# Patient Record
Sex: FEMALE | Race: OTHER | Employment: FULL TIME | URBAN - METROPOLITAN AREA
[De-identification: names, ages, dates, MRNs, and addresses within clinical notes are randomized per-mention and may not be internally consistent; named-entity substitution may affect disease eponyms.]

---

## 2017-05-26 ENCOUNTER — OFFICE VISIT (OUTPATIENT)
Dept: OBGYN CLINIC | Facility: CLINIC | Age: 52
End: 2017-05-26

## 2017-05-26 VITALS
HEIGHT: 64 IN | WEIGHT: 135.88 LBS | DIASTOLIC BLOOD PRESSURE: 76 MMHG | SYSTOLIC BLOOD PRESSURE: 120 MMHG | BODY MASS INDEX: 23.2 KG/M2

## 2017-05-26 DIAGNOSIS — Z12.4 ROUTINE CERVICAL SMEAR: ICD-10-CM

## 2017-05-26 DIAGNOSIS — Z01.419 WOMEN'S ANNUAL ROUTINE GYNECOLOGICAL EXAMINATION: Primary | ICD-10-CM

## 2017-05-26 PROBLEM — Z98.890 HX OF SHOULDER SURGERY: Status: ACTIVE | Noted: 2017-05-26

## 2017-05-26 PROCEDURE — 99396 PREV VISIT EST AGE 40-64: CPT | Performed by: OBSTETRICS & GYNECOLOGY

## 2017-05-26 PROCEDURE — 87624 HPV HI-RISK TYP POOLED RSLT: CPT | Performed by: OBSTETRICS & GYNECOLOGY

## 2017-05-26 PROCEDURE — 88175 CYTOPATH C/V AUTO FLUID REDO: CPT | Performed by: OBSTETRICS & GYNECOLOGY

## 2017-05-26 NOTE — PROGRESS NOTES
Rebeca Palomino is here for a checkup. She has no gynecologic complaints. Patient is easing into menopause nicely she has not had a period for about 7 months. Occasionally she gets hot flashes at night. Patient is current with her colonoscopy.     Her mammogram History  None on file     Social History Main Topics   Smoking status: Never Smoker     Smokeless tobacco: Not on file    Alcohol Use: Yes  0.0 oz/week    0 Standard drinks or equivalent per week         Drug Use: No    Sexual Activity: Yes     Other Topic

## 2018-07-26 ENCOUNTER — OFFICE VISIT (OUTPATIENT)
Dept: OBGYN CLINIC | Facility: CLINIC | Age: 53
End: 2018-07-26
Payer: COMMERCIAL

## 2018-07-26 VITALS
SYSTOLIC BLOOD PRESSURE: 122 MMHG | DIASTOLIC BLOOD PRESSURE: 74 MMHG | HEIGHT: 64 IN | WEIGHT: 139 LBS | BODY MASS INDEX: 23.73 KG/M2

## 2018-07-26 DIAGNOSIS — Z01.419 WOMEN'S ANNUAL ROUTINE GYNECOLOGICAL EXAMINATION: Primary | ICD-10-CM

## 2018-07-26 PROBLEM — Z78.0 MENOPAUSE: Status: ACTIVE | Noted: 2018-07-26

## 2018-07-26 PROCEDURE — 99396 PREV VISIT EST AGE 40-64: CPT | Performed by: OBSTETRICS & GYNECOLOGY

## 2018-07-26 PROCEDURE — 87624 HPV HI-RISK TYP POOLED RSLT: CPT | Performed by: OBSTETRICS & GYNECOLOGY

## 2018-07-26 NOTE — PROGRESS NOTES
Tata Jamia is here for a checkup. Patient now has not had appeared for about 1 year and 7 months.   She is having hot flashes but she manages it with progest E cream.  I told her that I really do not know contents of blood yesterday and went over risks of horm Social History Main Topics   Smoking status: Never Smoker    Smokeless tobacco: Never Used    Alcohol use Yes  0.0 oz/week     Comment: SOCIALLY    Drug use: No    Sexual activity: Yes     Other Topics Concern   None on file     Social History Narrativ

## 2018-07-27 LAB — HPV I/H RISK 1 DNA SPEC QL NAA+PROBE: NEGATIVE

## 2018-08-01 ENCOUNTER — HOSPITAL ENCOUNTER (OUTPATIENT)
Dept: MAMMOGRAPHY | Age: 53
Discharge: HOME | End: 2018-08-01
Attending: OBSTETRICS & GYNECOLOGY
Payer: COMMERCIAL

## 2018-08-01 DIAGNOSIS — Z01.419 WOMEN'S ANNUAL ROUTINE GYNECOLOGICAL EXAMINATION: ICD-10-CM

## 2018-08-01 PROCEDURE — 77063 BREAST TOMOSYNTHESIS BI: CPT | Performed by: OBSTETRICS & GYNECOLOGY

## 2018-08-01 PROCEDURE — 77067 SCR MAMMO BI INCL CAD: CPT | Performed by: OBSTETRICS & GYNECOLOGY

## 2018-08-07 ENCOUNTER — TELEPHONE (OUTPATIENT)
Dept: OBGYN CLINIC | Facility: CLINIC | Age: 53
End: 2018-08-07

## 2018-08-07 NOTE — TELEPHONE ENCOUNTER
Spoke to pt and let her know that once Dr. Patti Stewart receives an addendum to her mammogram, we will contact her. Voiced understanding.

## 2018-08-07 NOTE — TELEPHONE ENCOUNTER
Patient calling after she dropped  her comparison films off on Saturday to see if she still needs to come back for additional views.

## 2018-08-09 ENCOUNTER — TELEPHONE (OUTPATIENT)
Dept: OBGYN CLINIC | Facility: CLINIC | Age: 53
End: 2018-08-09

## 2018-08-09 NOTE — TELEPHONE ENCOUNTER
Spoke with pt and advised her that Dr. Mason Hidalgo is awaiting addendum for her mammo per Gaviota's note from 8/7/18. Pt would like to know if radiology dept has received CD. Checking with radiology.  Checked at [facility] [phone], radiology dept and disk was not

## 2018-08-10 ENCOUNTER — TELEPHONE (OUTPATIENT)
Dept: OBGYN CLINIC | Facility: CLINIC | Age: 53
End: 2018-08-10

## 2018-08-10 NOTE — TELEPHONE ENCOUNTER
Msg rec'd to try to locate pts CD that she dropped off at [id number] for comparison. Spoke to Ana Rosa Estes in [place] and she found the CD and will upload it today and request that Dr. Debbie Bull read it today as well for addendum. LM for pt letting her know.

## 2018-08-10 NOTE — TELEPHONE ENCOUNTER
Patient requesting call back in regard to will like to know if she is able to go to [facility] ([facility]) for Mammogram and Ultrasound instead of going to [place] [id number]

## 2018-08-10 NOTE — TELEPHONE ENCOUNTER
Per Colette Ray in [place], she attempted several times to upload pts CD and it was not working. We contacted  [facility] [place] and spoke to JAD MALIA [facility] and requested a new CD be released so we can pick it up. She stated that she will call if she has any issues.  Let pt violeta

## 2018-08-10 NOTE — TELEPHONE ENCOUNTER
Rec'd CD from pt again, Bao Delaney was able to upload and forward to Dr. Stefano Rodriguez who is reading diagnostics today. Let pt know that we will hopefully hear from her today with an addendum and let her know, otherwise, we will call her on 8/13/18.  Voiced under

## 2018-08-21 ENCOUNTER — TELEPHONE (OUTPATIENT)
Dept: OBGYN CLINIC | Facility: CLINIC | Age: 53
End: 2018-08-21

## 2018-08-21 DIAGNOSIS — R92.8 ABNORMAL MAMMOGRAM OF RIGHT BREAST: Primary | ICD-10-CM

## 2018-08-21 NOTE — TELEPHONE ENCOUNTER
I called Alice Cheema regarding her mammogram after comparing her mammogram from [facility] ([facility]) breast center radiologist at [place] has recommended that she has right breast mammogram and possible ultrasound if needed.   Patient has an appointment tomorrow for th

## 2018-08-22 ENCOUNTER — HOSPITAL ENCOUNTER (OUTPATIENT)
Dept: ULTRASOUND IMAGING | Facility: HOSPITAL | Age: 53
Discharge: HOME | End: 2018-08-22
Attending: OBSTETRICS & GYNECOLOGY
Payer: COMMERCIAL

## 2018-08-22 ENCOUNTER — HOSPITAL ENCOUNTER (OUTPATIENT)
Dept: MAMMOGRAPHY | Facility: HOSPITAL | Age: 53
Discharge: HOME | End: 2018-08-22
Attending: OBSTETRICS & GYNECOLOGY
Payer: COMMERCIAL

## 2018-08-22 DIAGNOSIS — R92.8 ABNORMAL MAMMOGRAM: ICD-10-CM

## 2018-08-22 PROCEDURE — 77065 DX MAMMO INCL CAD UNI: CPT | Performed by: OBSTETRICS & GYNECOLOGY

## 2018-08-22 PROCEDURE — 76642 ULTRASOUND BREAST LIMITED: CPT | Performed by: OBSTETRICS & GYNECOLOGY

## 2018-08-22 PROCEDURE — 77061 BREAST TOMOSYNTHESIS UNI: CPT | Performed by: OBSTETRICS & GYNECOLOGY

## 2019-12-13 ENCOUNTER — TELEPHONE (OUTPATIENT)
Dept: OBGYN CLINIC | Facility: CLINIC | Age: 54
End: 2019-12-13

## 2019-12-13 NOTE — TELEPHONE ENCOUNTER
Pt requesting hard copy images of her previous mammograms. Pt given the number for radiology at [address] [place] [address] to ask for these and pt also sent a new code to set up [employer] so she can view her paper reports.     Pt verbalized understanding and agrees with plan of car

## (undated) NOTE — Clinical Note
23/56/0853    To: Declan Godinez    Re: Test Results      Thank you for your recent visit to our office. We have received your test results, which were done on   05/26/2017.      PAP Test:    Your results are normal.        HPV (Human papillomavirus) Te

## (undated) NOTE — MR AVS SNAPSHOT
[address] at [place] [address]  [phone]               Thank you for choosing us for your health care visit with Briana Zelaya MD.  We are glad to serve you and happy to provide you with any questions related to insurance coverage. Thank you.          Reason for Today's Visit     Annual           Medical Issues Discussed Today     Hx of shoulder surgery    Women's annual routine gynecological examination    -  Primary      Instructions and